# Patient Record
Sex: MALE | Race: ASIAN | NOT HISPANIC OR LATINO | URBAN - METROPOLITAN AREA
[De-identification: names, ages, dates, MRNs, and addresses within clinical notes are randomized per-mention and may not be internally consistent; named-entity substitution may affect disease eponyms.]

---

## 2020-02-13 ENCOUNTER — OFFICE VISIT - HEALTHEAST (OUTPATIENT)
Dept: FAMILY MEDICINE | Facility: CLINIC | Age: 25
End: 2020-02-13

## 2020-02-13 DIAGNOSIS — L50.9 HIVES: ICD-10-CM

## 2020-02-14 ENCOUNTER — COMMUNICATION - HEALTHEAST (OUTPATIENT)
Dept: SCHEDULING | Facility: CLINIC | Age: 25
End: 2020-02-14

## 2020-02-14 ENCOUNTER — OFFICE VISIT - HEALTHEAST (OUTPATIENT)
Dept: INTERNAL MEDICINE | Facility: CLINIC | Age: 25
End: 2020-02-14

## 2020-02-14 DIAGNOSIS — L50.9 HIVES: ICD-10-CM

## 2020-02-14 RX ORDER — PREDNISONE 20 MG/1
40 TABLET ORAL DAILY
Qty: 10 TABLET | Refills: 0 | Status: SHIPPED | OUTPATIENT
Start: 2020-02-14

## 2021-06-04 VITALS
TEMPERATURE: 98.1 F | SYSTOLIC BLOOD PRESSURE: 126 MMHG | RESPIRATION RATE: 16 BRPM | WEIGHT: 198 LBS | DIASTOLIC BLOOD PRESSURE: 90 MMHG | HEART RATE: 90 BPM | OXYGEN SATURATION: 100 %

## 2021-06-04 VITALS — WEIGHT: 202.2 LBS | SYSTOLIC BLOOD PRESSURE: 120 MMHG | DIASTOLIC BLOOD PRESSURE: 74 MMHG | HEART RATE: 88 BPM

## 2021-06-06 NOTE — PATIENT INSTRUCTIONS - HE
Rest, push fluids.    Stop the oral Medrol dose pack.    Start the prednisone today.  Prednisone 40 mg daily for 5 days total.    Please start Zyrtec (cetirizine) 10 mg daily for the next 2 weeks.    Please start Pepcid (famotidine) 1 tablet twice daily, a.m./p.m.  This will help reduce the histamine production in your stomach.    Okay to take Benadryl as needed for breakthrough itching.    I have placed a referral for you to see our allergist here at the [facility].  They will call you to set up an appointment for this.    If you have tongue, mouth, lip itching/swelling, shortness of breath, or your rash continues to get worse please seek immediate attention at the emergency room.    I will plan on seeing you back in about 3 months to establish care and for a routine physical.

## 2021-06-06 NOTE — PROGRESS NOTES
Acute Visit Note    Assessment:     Assessment and Plan:  1. Hives: Unclear etiology of hives.  Medrol Dosepak is not been effective.  Will use a stronger steroid, prednisone to help with this outbreak.  Discussed home supportive measures.  - predniSONE (DELTASONE) 20 MG tablet; Take 40 mg by mouth daily.  Dispense: 10 tablet; Refill: 0  - Ambulatory referral to Allergy  -Zyrtec (cetirizine) 10 mg daily for the next 2 weeks.  -Famotidine (Pepcid), 1 tablet twice daily for the next 2 weeks.  -Benadryl as needed for breakthrough itching.  -Cool showers, do not use warm water on skin.  -No working out until rash has gone away.  -ER if symptoms worsen over the weekend as discussed.     Patient Instructions   Rest, push fluids.    Stop the oral Medrol dose pack.    Start the prednisone today.  Prednisone 40 mg daily for 5 days total.    Please start Zyrtec (cetirizine) 10 mg daily for the next 2 weeks.    Please start Pepcid (famotidine) 1 tablet twice daily, a.m./p.m.  This will help reduce the histamine production in your stomach.    Okay to take Benadryl as needed for breakthrough itching.    I have placed a referral for you to see our allergist here at the [facility].  They will call you to set up an appointment for this.    If you have tongue, mouth, lip itching/swelling, shortness of breath, or your rash continues to get worse please seek immediate attention at the emergency room.    I will plan on seeing you back in about 3 months to establish care and for a routine physical.    Return in about 3 months (around 5/14/2020) for Annual physical and establish care.         Subjective:      Felton Chavez is a 24 y.o. male who presents today with concerns of worsening hives.  He reports being seen yesterday 2/13 in the walk-in clinic for this, was placed on a Medrol Dosepak.  He reports that his hives continue to spread and are worse than yesterday.  He was taking a total of 8 doses of the Medrol.    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  He reports eating a [employer] burger for lunch, this is not abnormal for him.  He reports eating a snack of noodles and eggs when he got home, took a nap from about 5:30 PM to 7:30 PM.  He woke up after this nap with tense itching behind his ears.  He reports noticing later on that night that his chest, groin, neck, and behind his ears were all itchy and red.  He reports trying oral Benadryl and topical cortisone cream at home but this did not help.  He denies any change in laundry soaps, perfumes, body washes, etc.    He denies any mouth, lip, tongue swelling or itching.  He denies any shortness of breath.    He denies having a current primary care provider.    Review of Systems:    Review is otherwise negative except for what is mentioned above.     Social Hx:    Social History     Tobacco Use   Smoking Status Never Smoker   Smokeless Tobacco Never Used         Objective:     Vitals:    02/14/20 1337   BP: 120/74   Pulse: 88   Weight: 202 lb 3.2 oz (91.7 kg)       Exam:  General: No apparent distress. Calm. Alert and Oriented X3. Pt behavior is appropriate.  Head:Atraumatic. Normocephalic.  Neck: Supple. No adenopathy.  Nose/Mouth/Throat: Pharynx clear and without exudate. Uvula mid-line. Nasal septum mid-line. Clear turbinates.   Neurologic: Interactive, alert, no focal findings.  Skin: Warm, dry. Normal hair pattern. Normal skin turgor. Raised wheals noted on arms, chest, back, neck, and behind ears.    Patient Active Problem List   Diagnosis     Hives     Current Outpatient Medications   Medication Sig Dispense Refill     predniSONE (DELTASONE) 20 MG tablet Take 40 mg by mouth daily. 10 tablet 0     No current facility-administered medications for this visit.        I spent 20 minutes with patient face to face, of which >50% was counseling regarding the above plan       Joan Romo, Adult-Geriatric Nurse  Practitioner  [facility] - Internal Medicine Team     2/14/2020

## 2021-06-06 NOTE — TELEPHONE ENCOUNTER
FNA triage call :   Presenting problem :  Pt called. Wed - ate noodle pack with ( shell fish - fish )   And had  Hives after 2 hours  and took  Benadryl .  Seen at [facility] yesterday and given medrol  taper dose .   Was having break thru Hives , intermittently  , and now  constant and worse  Generalized  Itching and  On face has  blood shot  Eyes  And  facial splotchy skin  .   Guideline used : Hives A Oh.   Disposition and recommendations : See today  In office and sent to  , made appt at [facility] 130 pm today.   Caller verbalizes understanding and denies further questions and will call back if further symptoms to triage or questions  . Serenity Small RN  - [facility] Nurse Advisor     Reason for Disposition    MODERATE-SEVERE hives persist (i.e., hives interfere with normal activities or work) and taking antihistamine (e.g., Benadryl, Claritin) > 24 hours    Protocols used: HIVES-A-OH

## 2021-06-16 PROBLEM — L50.9 HIVES: Status: ACTIVE | Noted: 2020-02-14

## 2021-06-18 NOTE — PATIENT INSTRUCTIONS - HE
Patient Instructions by Gilberto Puckett DO at 2/13/2020  8:40 AM     Author: Gilberto Puckett DO Service: -- Author Type: Physician    Filed: 2/13/2020  9:40 AM Encounter Date: 2/13/2020 Status: Addendum    : Gilberto Puckett DO (Physician)    Related Notes: Original Note by Gilberto Puckett DO (Physician) filed at 2/13/2020  9:39 AM       You can continue to use OTC oral Benadryl or Zyrtec for symptomatic relief. I think the area of rash is tool large to use a steroid cream. Fill the oral steroid prescription if the rash is not gone in 2 days, or does not improve with the Benadryl or Zyrtec treatment. Being seen by an allergist may be needed if this is a recurrent problem. I most strongly suspect some clothing as a cause.    Patient Education     Hives (Adult)  Hives are pink or red bumps on the skin. These bumps are also known as wheals. The bumps can itch, burn, or sting. Hives can occur anywhere on the body. They vary in size and shape and can form in clusters. Individual hives can appear and go away quickly. New hives may develop as old ones fade. Hives are common and usually harmless. Occasionally hives are a sign of a serious allergy.  Hives are often caused by an allergic reaction. It may be an allergic reaction to foods such as fruit, shellfish, chocolate, nuts, or tomatoes. It may be a reaction to pollens, animal fur, or mold spores. Medicines, chemicals, and insect bites can also cause hives. And hives can be caused by hot sun or cold air. The cause of hives can be difficult to find.  You may be given medicines to relieve swelling and itching. Follow all instructions when using these medicines. The hives will usually fade in a few days, but can last up to 2 weeks.  Home care  Follow these tips:    Try to find the cause of the hives and eliminate it. Discuss possible causes with your healthcare provider. Future reactions to the same allergen may be worse.    Dont scratch the hives. Scratching will delay  healing. To reduce itching, apply cool, wet compresses to the skin.    Dress in soft, loose cotton clothing.    Dont bathe in hot water. This can make the itching worse.    Apply an ice pack or cool pack wrapped in a thin towel to your skin. This will help reduce redness and itching. But if your hives were caused by exposure to cold, then do not apply more cold to them.    You may use over-the counter antihistamines to reduce itching. Some older antihistamines, such as diphenhydramine and chlorpheniramine, are inexpensive. But they need to be taken often and may make you sleepy. They are best used at bedtime. Dont use diphenhydramine if you have glaucoma or have trouble urinating because of an enlarged prostate. Newer antihistamines, such as loratadine, cetirizine, and fexofenadine, are generally more expensive. But they tend to have fewer side effects, such as drowsiness. They can be taken less often.    Another type of antihistamine is used to treat heartburn. This type includes ranitidine, nizatidine, famotidine, and cimetidine. These are sometimes used along with the above antihistamines if a single medicine is not working.  Follow-up care  Follow up with your healthcare provider if your symptoms don't get better in 2 days. Ask your provider about allergy testing if you have had a severe reaction, or have had several episodes of hives. He or she can use the allergy testing to find out what you are allergic to.  When to seek medical advice  Call your healthcare provider right away if any of these occur:    Fever of 100.4 F (38.0 C) or higher, or as directed by your healthcare provider    Redness, swelling, or pain    Foul-smelling fluid coming from the rash  Call 911  Call 911 if any of the following occur:    Swelling of the face, throat, or tongue    Trouble breathing or swallowing    Dizziness, weakness, or fainting  Date Last Reviewed: 9/1/2016 2000-2017 [employer]. [address] [facility],  [place] [address]. All rights reserved. This information is not intended as a substitute for professional medical care. Always follow your healthcare professional's instructions.

## 2021-06-28 NOTE — PROGRESS NOTES
Progress Notes by Gilberto Puckett DO at 2/13/2020  8:40 AM     Author: Gilberto Puckett DO Service: -- Author Type: Physician    Filed: 2/13/2020  1:07 PM Encounter Date: 2/13/2020 Status: Signed    : Gilberto Puckett DO (Physician)       Chief Complaint   Patient presents with   ? Rash     on upper body x1 day        History of Present Illness: Rooming staff notes reviewed. Patient noted a rash yesterday when he was in his bed. His skin is less irritated after using OTC hydrocortisone cream, and oral Benadryl. His blood pressure is normally good, checking it frequently at work.     Review of systems: See history of present illness, all others negative.     Current Outpatient Medications   Medication Sig Dispense Refill   ? diphenhydrAMINE (BENADRYL) 2 % cream Apply topically 3 (three) times a day as needed for itching.     ? methylPREDNISolone (MEDROL DOSEPACK) 4 mg tablet follow package directions 21 tablet 0     No current facility-administered medications for this visit.      No past medical history on file.   No past surgical history on file.   Social History     Tobacco Use   ? Smoking status: Never Smoker   ? Smokeless tobacco: Never Used   Substance Use Topics   ? Alcohol use: Not on file   ? Drug use: Not on file        No family history on file.    Vitals:    02/13/20 0919 02/13/20 0923   BP: (!) 137/101 126/90   Patient Site: Right Arm Right Arm   Patient Position: Sitting Sitting   Cuff Size: Adult Large Adult Large   Pulse: 90    Resp: 16    Temp: 98.1  F (36.7  C)    TempSrc: Oral    SpO2: 100%    Weight: 198 lb (89.8 kg)      Wt Readings from Last 3 Encounters:   02/13/20 198 lb (89.8 kg)     Temp Readings from Last 3 Encounters:   02/13/20 98.1  F (36.7  C) (Oral)   10/24/15 97.8  F (36.6  C) (Oral)     BP Readings from Last 3 Encounters:   02/13/20 126/90   10/24/15 (!) 152/83     Pulse Readings from Last 3 Encounters:   02/13/20 90   10/24/15 86     EXAM:   General: Vital signs reviewed.   Patient is in no acute appearing distress.  Breathing appears nonlabored.  Patient is alert and oriented ×3.  Skin exam: Patient has moderatly intense hives over shirt area and upper pelvis, and base of neck, with approximately 25 to 30% of skin area in this region of body being involved.  No vesicles, or scabs noted.  Some areas are confluent to a diameter of approximately 4 to 5 cm.  Heart: Normal rate and rhythm without murmur  Lungs: Clear to auscultation with good air flow bilaterally.    Assessment/Plan   1. Hives  methylPREDNISolone (MEDROL DOSEPACK) 4 mg tablet       Patient Instructions   You can continue to use OTC oral Benadryl or Zyrtec for symptomatic relief. I think the area of rash is tool large to use a steroid cream. Fill the oral steroid prescription if the rash is not gone in 2 days, or does not improve with the Benadryl or Zyrtec treatment. Being seen by an allergist may be needed if this is a recurrent problem. I most strongly suspect some clothing as a cause.    Patient Education     Hives (Adult)  Hives are pink or red bumps on the skin. These bumps are also known as wheals. The bumps can itch, burn, or sting. Hives can occur anywhere on the body. They vary in size and shape and can form in clusters. Individual hives can appear and go away quickly. New hives may develop as old ones fade. Hives are common and usually harmless. Occasionally hives are a sign of a serious allergy.  Hives are often caused by an allergic reaction. It may be an allergic reaction to foods such as fruit, shellfish, chocolate, nuts, or tomatoes. It may be a reaction to pollens, animal fur, or mold spores. Medicines, chemicals, and insect bites can also cause hives. And hives can be caused by hot sun or cold air. The cause of hives can be difficult to find.  You may be given medicines to relieve swelling and itching. Follow all instructions when using these medicines. The hives will usually fade in a few days, but can  last up to 2 weeks.  Home care  Follow these tips:    Try to find the cause of the hives and eliminate it. Discuss possible causes with your healthcare provider. Future reactions to the same allergen may be worse.    Dont scratch the hives. Scratching will delay healing. To reduce itching, apply cool, wet compresses to the skin.    Dress in soft, loose cotton clothing.    Dont bathe in hot water. This can make the itching worse.    Apply an ice pack or cool pack wrapped in a thin towel to your skin. This will help reduce redness and itching. But if your hives were caused by exposure to cold, then do not apply more cold to them.    You may use over-the counter antihistamines to reduce itching. Some older antihistamines, such as diphenhydramine and chlorpheniramine, are inexpensive. But they need to be taken often and may make you sleepy. They are best used at bedtime. Dont use diphenhydramine if you have glaucoma or have trouble urinating because of an enlarged prostate. Newer antihistamines, such as loratadine, cetirizine, and fexofenadine, are generally more expensive. But they tend to have fewer side effects, such as drowsiness. They can be taken less often.    Another type of antihistamine is used to treat heartburn. This type includes ranitidine, nizatidine, famotidine, and cimetidine. These are sometimes used along with the above antihistamines if a single medicine is not working.  Follow-up care  Follow up with your healthcare provider if your symptoms don't get better in 2 days. Ask your provider about allergy testing if you have had a severe reaction, or have had several episodes of hives. He or she can use the allergy testing to find out what you are allergic to.  When to seek medical advice  Call your healthcare provider right away if any of these occur:    Fever of 100.4 F (38.0 C) or higher, or as directed by your healthcare provider    Redness, swelling, or pain    Foul-smelling fluid coming from the  rash  Call 911  Call 911 if any of the following occur:    Swelling of the face, throat, or tongue    Trouble breathing or swallowing    Dizziness, weakness, or fainting  Date Last Reviewed: 9/1/2016 2000-2017 [employer]. [address] [place] [address]. All rights reserved. This information is not intended as a substitute for professional medical care. Always follow your healthcare professional's instructions.              Gilberto Puckett,

## 2025-02-07 ENCOUNTER — OFFICE VISIT (OUTPATIENT)
Dept: URGENT CARE | Facility: URGENT CARE | Age: 30
End: 2025-02-07
Payer: COMMERCIAL

## 2025-02-07 VITALS
DIASTOLIC BLOOD PRESSURE: 82 MMHG | TEMPERATURE: 98.7 F | WEIGHT: 216.38 LBS | SYSTOLIC BLOOD PRESSURE: 119 MMHG | HEART RATE: 91 BPM | OXYGEN SATURATION: 98 % | RESPIRATION RATE: 16 BRPM

## 2025-02-07 DIAGNOSIS — U07.1 INFECTION DUE TO 2019 NOVEL CORONAVIRUS: ICD-10-CM

## 2025-02-07 DIAGNOSIS — J06.9 UPPER RESPIRATORY TRACT INFECTION, UNSPECIFIED TYPE: Primary | ICD-10-CM

## 2025-02-07 LAB
FLUAV AG SPEC QL IA: NEGATIVE
FLUBV AG SPEC QL IA: NEGATIVE

## 2025-02-07 PROCEDURE — 99203 OFFICE O/P NEW LOW 30 MIN: CPT | Performed by: PHYSICIAN ASSISTANT

## 2025-02-07 PROCEDURE — 87804 INFLUENZA ASSAY W/OPTIC: CPT | Performed by: PHYSICIAN ASSISTANT

## 2025-02-07 PROCEDURE — 87635 SARS-COV-2 COVID-19 AMP PRB: CPT | Performed by: PHYSICIAN ASSISTANT

## 2025-02-07 NOTE — PROGRESS NOTES
Assessment & Plan     Upper respiratory tract infection, unspecified type  Covid 19 pending.  Exam reassuring. No sign of bacterial infection, likely viral. Push fluids, rest and ibuprofen or tylenol for comfort.    RTC for persistent or worsening sx.   At the end of the encounter, I discussed results, diagnosis, medications. Discussed red flags for immediate return to clinic/ER, as well as indications for follow up if no improvement. Patient understood and agreed to plan. Patient was stable for discharge      - COVID-19 Virus (Coronavirus) by PCR Nose  - Influenza A & B Antigen - Clinic Collect           Angela Raya PA-C  [facility] YANDY Chambersb is a 29 year old male who presents to clinic today for the following health issues:  Chief Complaint   Patient presents with    Cough     Started yesterday with cough sore throat body aches        HPI  Pt is an otherwise healthy 29 year old male who presents to urgent care with concern re: cough, sore throat and body aches.   Feverish.  No temp taken.   No rhinorrhae, congestion.  Does have cough. No sob.   No CP.  No nausea/vomiting/diarrhea. Andrésnter has similar sx.      Review of Systems  Constitutional, HEENT, cardiovascular, pulmonary, gi and gu systems are negative, except as otherwise noted.      Patient Active Problem List   Diagnosis    Hives     Current Outpatient Medications   Medication Sig Dispense Refill    predniSONE (DELTASONE) 20 MG tablet [PREDNISONE (DELTASONE) 20 MG TABLET] Take 40 mg by mouth daily. (Patient not taking: Reported on 2/7/2025) 10 tablet 0     No current facility-administered medications for this visit.       Objective    /82   Pulse 91   Temp 98.7  F (37.1  C) (Oral)   Resp 16   Wt 98.1 kg (216 lb 6 oz)   SpO2 98%   Physical Exam.  Pt is in no acute distress and appears well  Ears patent B:  TM s intact, non-injected. All land marks easily visibile    Nasal mucosa is non-edematous,  no discharge.    Pharynx: non erythematous, tonsils non hypertrophied, No exudate   Neck supple: no adenopathy  Lungs: CTA  Heart: RRR, no murmur, no thrills or heaves   Ext: no edema  Skin: no rashes    Results for orders placed or performed in visit on 02/07/25   Influenza A & B Antigen - Clinic Collect     Status: Normal    Specimen: Nose; Swab   Result Value Ref Range    Influenza A antigen Negative Negative    Influenza B antigen Negative Negative    Narrative    Test results must be correlated with clinical data. If necessary, results should be confirmed by a molecular assay or viral culture.         Addendum: covid 19 returned positive, RX for paxolivd sent to pharmacy.

## 2025-02-08 LAB — SARS-COV-2 RNA RESP QL NAA+PROBE: POSITIVE

## 2025-02-09 ENCOUNTER — TELEPHONE (OUTPATIENT)
Dept: URGENT CARE | Facility: URGENT CARE | Age: 30
End: 2025-02-09
Payer: COMMERCIAL

## 2025-02-09 NOTE — PATIENT INSTRUCTIONS
Paxlovid is no longer free from the government. There are financial assistance programs available. You can learn more at https://paxlovid.[employer].com/ or 1-766.526.6497, press 2 for patient options. Please look into this before you go to the pharmacy to  your medicine.

## 2025-02-10 ENCOUNTER — TELEPHONE (OUTPATIENT)
Dept: URGENT CARE | Facility: URGENT CARE | Age: 30
End: 2025-02-10
Payer: COMMERCIAL

## 2025-02-10 NOTE — TELEPHONE ENCOUNTER
----- Message from Angela Raya sent at 2/8/2025  6:41 PM CST -----  Team - please call patient with results.  Paxlovid oral antiviral medication sent to his pharmacy.    Most common side effect is metallic taste in the mouth    Let him know I put the following in his AVS on Mychart for cost info:      Paxlovid is no longer free from the government. There are financial assistance programs available. You can learn more at https://paxlovid.[employer]/ or 1-271.657.9448, press 2 for patient options. Please look into this before you go to the pharmacy to  your medicine.

## 2025-02-10 NOTE — TELEPHONE ENCOUNTER
Called patient, no answer. Looks like voicemail was left with call back number. Writer will also send patient a message via [employer] relaying providers message.    Nora Ray MA on 02/10/2025 at 3:05 PM

## 2025-02-16 ENCOUNTER — HEALTH MAINTENANCE LETTER (OUTPATIENT)
Age: 30
End: 2025-02-16